# Patient Record
Sex: MALE | Race: BLACK OR AFRICAN AMERICAN | NOT HISPANIC OR LATINO | Employment: FULL TIME | URBAN - METROPOLITAN AREA
[De-identification: names, ages, dates, MRNs, and addresses within clinical notes are randomized per-mention and may not be internally consistent; named-entity substitution may affect disease eponyms.]

---

## 2017-08-27 ENCOUNTER — HOSPITAL ENCOUNTER (EMERGENCY)
Facility: HOSPITAL | Age: 42
Discharge: HOME | End: 2017-08-27
Attending: EMERGENCY MEDICINE
Payer: COMMERCIAL

## 2017-08-27 VITALS
WEIGHT: 232 LBS | RESPIRATION RATE: 20 BRPM | BODY MASS INDEX: 34.36 KG/M2 | OXYGEN SATURATION: 98 % | HEART RATE: 84 BPM | HEIGHT: 69 IN | TEMPERATURE: 99 F | DIASTOLIC BLOOD PRESSURE: 70 MMHG | SYSTOLIC BLOOD PRESSURE: 167 MMHG

## 2017-08-27 DIAGNOSIS — R42 DIZZINESS: Primary | ICD-10-CM

## 2017-08-27 DIAGNOSIS — H04.201 EYE TEARING, RIGHT: ICD-10-CM

## 2017-08-27 DIAGNOSIS — J34.1 MUCOUS RETENTION CYST OF MAXILLARY SINUS: ICD-10-CM

## 2017-08-27 LAB
ALBUMIN SERPL BCP-MCNC: 4.1 G/DL
ALP SERPL-CCNC: 49 U/L
ALT SERPL W/O P-5'-P-CCNC: 25 U/L
ANION GAP SERPL CALC-SCNC: 10 MMOL/L
AST SERPL-CCNC: 16 U/L
BASOPHILS # BLD AUTO: 0.01 K/UL
BASOPHILS NFR BLD: 0.2 %
BILIRUB SERPL-MCNC: 0.5 MG/DL
BILIRUB UR QL STRIP: NEGATIVE
BUN SERPL-MCNC: 12 MG/DL
CALCIUM SERPL-MCNC: 9.6 MG/DL
CHLORIDE SERPL-SCNC: 105 MMOL/L
CLARITY UR: CLEAR
CO2 SERPL-SCNC: 25 MMOL/L
COLOR UR: ABNORMAL
CREAT SERPL-MCNC: 1.3 MG/DL
DIFFERENTIAL METHOD: NORMAL
EOSINOPHIL # BLD AUTO: 0.1 K/UL
EOSINOPHIL NFR BLD: 1.2 %
ERYTHROCYTE [DISTWIDTH] IN BLOOD BY AUTOMATED COUNT: 12.2 %
EST. GFR  (AFRICAN AMERICAN): >60 ML/MIN/1.73 M^2
EST. GFR  (NON AFRICAN AMERICAN): >60 ML/MIN/1.73 M^2
GLUCOSE SERPL-MCNC: 105 MG/DL
GLUCOSE UR QL STRIP: NEGATIVE
HCT VFR BLD AUTO: 41.2 %
HGB BLD-MCNC: 14 G/DL
HGB UR QL STRIP: ABNORMAL
KETONES UR QL STRIP: NEGATIVE
LEUKOCYTE ESTERASE UR QL STRIP: NEGATIVE
LYMPHOCYTES # BLD AUTO: 1.8 K/UL
LYMPHOCYTES NFR BLD: 30.6 %
MCH RBC QN AUTO: 29.7 PG
MCHC RBC AUTO-ENTMCNC: 34 G/DL
MCV RBC AUTO: 88 FL
MICROSCOPIC COMMENT: ABNORMAL
MONOCYTES # BLD AUTO: 0.5 K/UL
MONOCYTES NFR BLD: 7.9 %
NEUTROPHILS # BLD AUTO: 3.5 K/UL
NEUTROPHILS NFR BLD: 59.8 %
NITRITE UR QL STRIP: NEGATIVE
PH UR STRIP: 5 [PH] (ref 5–8)
PLATELET # BLD AUTO: 298 K/UL
PMV BLD AUTO: 10.7 FL
POCT GLUCOSE: 106 MG/DL (ref 70–110)
POCT GLUCOSE: 97 MG/DL (ref 70–110)
POTASSIUM SERPL-SCNC: 4.2 MMOL/L
PROT SERPL-MCNC: 8 G/DL
PROT UR QL STRIP: NEGATIVE
RBC # BLD AUTO: 4.71 M/UL
RBC #/AREA URNS HPF: 5 /HPF (ref 0–4)
SODIUM SERPL-SCNC: 140 MMOL/L
SP GR UR STRIP: 1.01 (ref 1–1.03)
TROPONIN I SERPL DL<=0.01 NG/ML-MCNC: 0.01 NG/ML
URN SPEC COLLECT METH UR: ABNORMAL
UROBILINOGEN UR STRIP-ACNC: NEGATIVE EU/DL
WBC # BLD AUTO: 5.82 K/UL

## 2017-08-27 PROCEDURE — 85025 COMPLETE CBC W/AUTO DIFF WBC: CPT

## 2017-08-27 PROCEDURE — 99284 EMERGENCY DEPT VISIT MOD MDM: CPT | Mod: 25

## 2017-08-27 PROCEDURE — 84484 ASSAY OF TROPONIN QUANT: CPT

## 2017-08-27 PROCEDURE — 81000 URINALYSIS NONAUTO W/SCOPE: CPT

## 2017-08-27 PROCEDURE — 93005 ELECTROCARDIOGRAM TRACING: CPT

## 2017-08-27 PROCEDURE — 80053 COMPREHEN METABOLIC PANEL: CPT

## 2017-08-27 PROCEDURE — 82962 GLUCOSE BLOOD TEST: CPT

## 2017-08-27 PROCEDURE — 25000003 PHARM REV CODE 250: Performed by: EMERGENCY MEDICINE

## 2017-08-27 PROCEDURE — 96360 HYDRATION IV INFUSION INIT: CPT

## 2017-08-27 RX ORDER — FLUTICASONE PROPIONATE 50 MCG
1 SPRAY, SUSPENSION (ML) NASAL 2 TIMES DAILY
Qty: 1 BOTTLE | Refills: 1 | Status: SHIPPED | OUTPATIENT
Start: 2017-08-27 | End: 2017-10-26

## 2017-08-27 RX ORDER — MECLIZINE HYDROCHLORIDE CHEWABLE TABLETS 25 MG/1
32 TABLET, CHEWABLE ORAL 3 TIMES DAILY PRN
COMMUNITY
End: 2022-11-04

## 2017-08-27 RX ADMIN — SODIUM CHLORIDE 1000 ML: 0.9 INJECTION, SOLUTION INTRAVENOUS at 02:08

## 2017-08-27 NOTE — ED TRIAGE NOTES
Vertigo - Dizziness: Patient presents with dizziness .  The dizziness has been present for 3 days. The patient describes the symptoms as disequalibirum and lightheadedness. Symptoms are exacerbated by rapid head movements, rising from supine position and motion The patient also complains of none. Patient denies otalgia  tinnitus.  He has been treated with meclizine (Antivert) with no improvement.  Previous work up has been since [facility] Saturday morning .

## 2017-08-27 NOTE — ED PROVIDER NOTES
"Encounter Date: 8/27/2017    SCRIBE #1 NOTE: I, Rianna Elpidio, am scribing for, and in the presence of, Gael Martell MD. Other sections scribed: HPI/ROS.       History     Chief Complaint   Patient presents with    Dizziness     Pt. presents with listed symptoms that began on Wednesday and patient states, "I began to feel dizzy on Thursday". Pt. is ambulatory at this time.     Blurred Vision     CC: Dizziness    Pt is a 42 y.o. male w/ no significant PMHx presenting to the ED c/o dizziness/lightheadedness and a sense of being off-balance since Friday (8/25/17) and blurred vision & watery eyes since Wednesday (8/23/17). All listed symptoms are intermittent. Pt states the lightheadedness and off-balance feelings are worsened with standing and walking. Pt states he went to [employer] yesterday and was told to attempt Visine; upon use, he found minimal temporary relief for his watery eyes. Pt also attempted Meclizine with no relief. Pt states he works outside and it has been "very muggy."     Pt states he had lightheadedness, dizziness, and the off-balance feeling in 2011 but found significant relief with Meclizine. Pt denies HA, nausea, SOB, or any pain. Pt reports no further symptoms.       The history is provided by the patient.     Review of patient's allergies indicates:   Allergen Reactions    Asa [aspirin]      G6 PD deficiency    Methylene blue Other (See Comments)    Nitrofuran analogues      G6 PD deficiency    Sulfa (sulfonamide antibiotics) Other (See Comments)     G6 PD deficiency    Vitamin k analogues      History reviewed. No pertinent past medical history.  History reviewed. No pertinent surgical history.  History reviewed. No pertinent family history.  Social History   Substance Use Topics    Smoking status: Never Smoker    Smokeless tobacco: Never Used    Alcohol use Yes     Review of Systems   Constitutional: Negative for chills and fever.   HENT: Negative for ear pain, rhinorrhea " and sore throat.    Eyes: Positive for visual disturbance. Negative for redness.        (+) watery eyes   Respiratory: Negative for cough and shortness of breath.    Cardiovascular: Negative for chest pain.   Gastrointestinal: Negative for abdominal pain, diarrhea, nausea and vomiting.   Genitourinary: Negative for dysuria, frequency and hematuria.   Musculoskeletal: Negative for arthralgias, back pain and myalgias.   Skin: Negative for color change, rash and wound.   Neurological: Positive for dizziness and light-headedness. Negative for syncope, weakness and headaches.   Psychiatric/Behavioral: The patient is not nervous/anxious.        Physical Exam     Initial Vitals [08/27/17 1311]   BP Pulse Resp Temp SpO2   (!) 143/79 81 17 98.6 °F (37 °C) 100 %      MAP       100.33         Physical Exam  Nursing note and vitals reviewed.  Constitutional:  Nontoxic middle-aged male in no obvious distress or discomfort.  HENT:    Head: NC/AT    Eyes: Conjunctivae normal.   (-) scleral icterus.              Mouth/Throat: Dry mucosal membranes..      Neck: Neck supple, normal rom.    Cardiovascular: RRR  Pulmonary/Chest: CTAB   Abdominal: Soft. ND/NT   (-) CVA tenderness.  Musculoskeletal: FROM of all major joints. No extremity edema or tenderness.  Neurological: A&Ox3, Normal speech.  No acute focal motor deficits.     Skin: Skin is warm and dry.   Psychiatric: normal mood and affect.      ED Course   Procedures  Labs Reviewed   COMPREHENSIVE METABOLIC PANEL - Abnormal; Notable for the following:        Result Value    Alkaline Phosphatase 49 (*)     All other components within normal limits   URINALYSIS - Abnormal; Notable for the following:     Occult Blood UA 2+ (*)     All other components within normal limits   URINALYSIS MICROSCOPIC - Abnormal; Notable for the following:     RBC, UA 5 (*)     All other components within normal limits   CBC W/ AUTO DIFFERENTIAL   TROPONIN I   POCT GLUCOSE   POCT GLUCOSE        EKG  "Readings: (Independently Interpreted)   Initial Reading: No STEMI.   Normal sinus rhythm, rate 75, normal axis/intervals, no ST/T-wave abnormalities.      Differential diagnosis:   Initial differential diagnosis includes but is not limited to...Dysrhythmia, ACS, electrolyte abnormalities, toxidrome, withdrawal syndrome, orthostatic hypotension/dehydration, vertebrobasilar insufficiency, ischemic vs hemorrhagic stroke.     Additional Medical Decision Makin-year-old male with no significant past medical history presents the emergency department complaining of persistent dizziness, blurry vision, sinus congestion and a sense of" being off-balance" for the past 3 days.  Physical exam reveals a healthy appearing male in no obvious distress or discomfort.  Neuro exam without focal motor and/or sensory deficits.  He is A&O×4 and without evidence of clinical intoxication.  Vs reassuring.  Afebrile.  Basic labs wnls.   EKG normal.  CT head Reveals a mucus retention cyst in the maxillary sinus.  Which may be contributing to sinus congestion along with vertiginous symptoms.  Recommended Flonase Twice daily as well as close follow-up with both his primary care physician as well as ENT.  Return instructions discussed prior to discharge.                    Scribe Attestation:   Scribe #1: I performed the above scribed service and the documentation accurately describes the services I performed. I attest to the accuracy of the note.    Attending Attestation:           Physician Attestation for Scribe:  Physician Attestation Statement for Scribe #1: I, Gael Martell MD, reviewed documentation, as scribed by Rianna Magallanes in my presence, and it is both accurate and complete.                 ED Course      Clinical Impression:   The primary encounter diagnosis was Dizziness. Diagnoses of Eye tearing, right and Mucous retention cyst of maxillary sinus were also pertinent to this visit.                           Gael TREVINO" MD Jayshree  09/25/17 2012

## 2022-11-04 ENCOUNTER — HOSPITAL ENCOUNTER (EMERGENCY)
Facility: HOSPITAL | Age: 47
Discharge: HOME | End: 2022-11-04
Attending: EMERGENCY MEDICINE
Payer: COMMERCIAL

## 2022-11-04 VITALS
WEIGHT: 227 LBS | TEMPERATURE: 98 F | OXYGEN SATURATION: 96 % | HEIGHT: 69 IN | BODY MASS INDEX: 33.62 KG/M2 | SYSTOLIC BLOOD PRESSURE: 139 MMHG | RESPIRATION RATE: 20 BRPM | DIASTOLIC BLOOD PRESSURE: 85 MMHG | HEART RATE: 78 BPM
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
## 2022-11-04 DIAGNOSIS — S09.90XA CLOSED HEAD INJURY, INITIAL ENCOUNTER: ICD-10-CM

## 2022-11-04 PROCEDURE — 99284 EMERGENCY DEPT VISIT MOD MDM: CPT | Mod: 25

## 2022-11-04 RX ORDER — ONDANSETRON 8 MG/1
8 TABLET, ORALLY DISINTEGRATING ORAL EVERY 6 HOURS PRN
Qty: 30 TABLET | Refills: 0 | Status: SHIPPED | OUTPATIENT
Start: 2022-11-04

## 2022-11-04 RX ORDER — MECLIZINE HYDROCHLORIDE 25 MG/1
25 TABLET ORAL 3 TIMES DAILY PRN
Qty: 20 TABLET | Refills: 0 | Status: SHIPPED | OUTPATIENT
Start: 2022-11-04

## 2022-11-04 NOTE — ED PROVIDER NOTES
Encounter Date: 11/4/2022       History     Chief Complaint   Patient presents with    Fall     Pt reports he was at work and lost his balance. He fell and struck his head on a railing. Pt denies loc, nausea, or vomiting.      47 y.o. male No past medical history on file. Presents for evaluation of closed head injury. Notes that he fell backwards after losing his balance at work at approx 2:30 AM, resulting in him striking his head on a guard rail. Denies LOC but notes feeling woozy, endorses a headache, denies neck/back pain. Not on blood thinners, no focal neuro complaints.    Review of patient's allergies indicates:   Allergen Reactions    Asa [aspirin]      G6 PD deficiency    Methylene blue Other (See Comments)    Nitrofuran analogues      G6 PD deficiency    Sulfa (sulfonamide antibiotics) Other (See Comments)     G6 PD deficiency    Vitamin k analogues      History reviewed. No pertinent past medical history.  History reviewed. No pertinent surgical history.  History reviewed. No pertinent family history.  Social History     Tobacco Use    Smoking status: Never    Smokeless tobacco: Never   Substance Use Topics    Alcohol use: Yes    Drug use: No     Review of Systems   Constitutional:  Negative for fever.   HENT:  Negative for sore throat.    Respiratory:  Negative for shortness of breath.    Cardiovascular:  Negative for chest pain.   Gastrointestinal:  Negative for nausea.   Genitourinary:  Negative for dysuria.   Musculoskeletal:  Negative for back pain.   Skin:  Negative for rash.   Neurological:  Negative for weakness.   Hematological:  Does not bruise/bleed easily.   All other systems reviewed and are negative.    Physical Exam     Initial Vitals [11/04/22 0400]   BP Pulse Resp Temp SpO2   (!) 156/90 81 17 98.2 °F (36.8 °C) 100 %      MAP       --         Physical Exam    Nursing note and vitals reviewed.  Constitutional: He appears well-developed and well-nourished.   HENT:   Head: Normocephalic and  atraumatic.   Eyes: EOM are normal. Pupils are equal, round, and reactive to light.   Cardiovascular:  Normal rate.           Pulmonary/Chest: Effort normal. No respiratory distress.   Abdominal: He exhibits no distension.   Musculoskeletal:         General: No tenderness or edema.     Neurological: He is alert and oriented to person, place, and time.   Skin: Skin is warm and dry.   Psychiatric: He has a normal mood and affect.     No midline ttp on any spinal body on neck/back  No cephalohematoma noted  ED Course   Procedures  Labs Reviewed - No data to display       Imaging Results              CT Head Without Contrast (Final result)  Result time 11/04/22 04:57:29      Final result by Teja Bueno MD (11/04/22 04:57:29)                   Impression:      There is no evidence for acute intracranial process.    Paranasal sinus disease, as above including small air-fluid level of the right maxillary antrum that can be seen with sinusitis.      Electronically signed by: Teja Bueno  Date:    11/04/2022  Time:    04:57               Narrative:    EXAMINATION:  CT HEAD WITHOUT CONTRAST    CLINICAL HISTORY:  Head trauma, moderate-severe;    TECHNIQUE:  Low dose axial images were obtained through the head.  Coronal and sagittal reformations were also performed. Contrast was not administered.    COMPARISON:  CT examination of the brain without contrast August 27, 2017    FINDINGS:  The ventricular system, sulcal pattern and parenchymal attenuation characteristics appear appropriate for age.  There is no evidence for intracranial mass, mass effect or midline shift.  There is no evidence for acute intracranial hemorrhage.  Appropriate CSF spaces are seen at the skull base.    The visualized orbits appear intact.  There is a small air-fluid level of the right maxillary antrum, there is an additional finding at the inferior right maxillary antrum that may relate to a moderate-sized mucous retention cyst.  The  paranasal sinuses otherwise appear predominantly well aerated as do the visualized mastoid air cells.  The visualized osseous structures appear intact.                                       CT Cervical Spine Without Contrast (Final result)  Result time 11/04/22 04:57:20      Final result by Teja Bueno MD (11/04/22 04:57:20)                   Impression:      Multilevel chronic change of the cervical spine, correlation for any specific level of symptomatology is needed.    On close evaluation of available imaging, there is no evidence for acute cervical spine fracture deformity.      Electronically signed by: Teja Bueno  Date:    11/04/2022  Time:    04:57               Narrative:    EXAMINATION:  CT CERVICAL SPINE WITHOUT CONTRAST    CLINICAL HISTORY:  Neck trauma, dangerous injury mechanism (Age 16-64y);    TECHNIQUE:  Low dose axial images, sagittal and coronal reformations were performed though the cervical spine.  Contrast was not administered.    COMPARISON:  None    FINDINGS:  There is straightening of the cervical spine.  Chronic appearing endplate change and marginal osteophyte formation is noted.  There is no evidence for high-grade spondylolisthesis.  There is no evidence for high-grade or acute compression fracture deformity.  Facet arthropathy is noted.  There is facet fusion on the left at C2-3.  There is no evidence for facet dislocation, there is no evidence for facet fracture.  The occipital condyles articulate appropriately with the superior articular facets of C1 at the craniocervical junction.    C1 and C2 appear intact.  There is no abnormal widening of the atlantal dental space.    C2-3 level demonstrates mild chronic change, there is mild foraminal narrowing on the left.  There is no high-grade spinal canal stenosis.    C3-4 level demonstrates mild disc osteophyte disease, mild anterior impression upon the dural sac.  There is mild left foraminal narrowing.    The C4-5 level  demonstrates disc osteophyte disease, mild anterior impression upon the dural sac.  There is no high-grade spinal canal stenosis.  Mild foraminal narrowing without high-grade stenosis.    Posterior to the C5 vertebral level there is osseous density along the left paramidline location, this may relate to calcifications/ossification along the left paramidline aspect of the posterior longitudinal ligament.  This does produce mild anterior impression upon the dural sac.    The C5-6 level demonstrates disc osteophyte disease with mild anterior impression upon the dural sac without high-grade spinal canal stenosis.  There is uncovertebral spurring, there is right greater than left foraminal narrowing.    The C6-7 level demonstrates mild disc osteophyte disease.  There is no high-grade spinal canal stenosis.  There is mild bilateral foraminal narrowing.    The C7-T1 level demonstrates disc osteophyte disease with mild anterior impression upon the dural sac.  Mild asymmetric osteophyte on the right.  There is no high-grade spinal canal stenosis.  There is bilateral foraminal narrowing.    On close evaluation of available imaging, there is no evidence for acute cervical spine fracture deformity.                                       Medications - No data to display                    Have ordered head ct/cspine ct given mechanism     Labs Reviewed - No data to display    CT Head Without Contrast   Final Result      There is no evidence for acute intracranial process.      Paranasal sinus disease, as above including small air-fluid level of the right maxillary antrum that can be seen with sinusitis.         Electronically signed by: Teja Bueno   Date:    11/04/2022   Time:    04:57      CT Cervical Spine Without Contrast   Final Result      Multilevel chronic change of the cervical spine, correlation for any specific level of symptomatology is needed.      On close evaluation of available imaging, there is no evidence for  acute cervical spine fracture deformity.         Electronically signed by: Teja Bueno   Date:    11/04/2022   Time:    04:57            Clinical Impression:   Final diagnoses:  [W19.XXXA] Fall, initial encounter (Primary)  [S09.90XA] Closed head injury, initial encounter      ED Disposition Condition    Discharge Stable          ED Prescriptions    None       Follow-up Information    None          Declan Mata MD  11/04/22 0508

## 2022-11-04 NOTE — Clinical Note
"Hay"Yarelis Alexandre was seen and treated in our emergency department on 11/4/2022.  He may return to work on 11/04/2022.  Return to work, full duty as tolerated without restrictions.     If you have any questions or concerns, please don't hesitate to call.      Declan Mata MD"

## 2022-11-04 NOTE — ED TRIAGE NOTES
"Pt presents to ED d/t fall while at work. Pt reports falling backwards, injuring the back of his head, reports 5/10 pain. Denies any vision changes, numbness, tingling, N/V/D, chest pain, SOB, fever, chills. Pt states, " I feel fine, I just want to make sure my head is okay". No trauma or active bleeding noted. Pt is alert, calm, and oriented x4, 100% on RA.   "

## 2022-11-04 NOTE — DISCHARGE INSTRUCTIONS
Thank you for coming to our Emergency Department today. It is important to remember that some problems or medical conditions are difficult to diagnose and may not be found or addressed during your Emergency Department visit.     Be sure to follow up with your primary care doctor and review all labs/imaging/tests that were performed during your ER visit with them. Some labs/imaging/tests may be outside of the normal range, and require non-emergent follow-up and/or further investigation/treatment/procedures/testing to help diagnose/exclude/prevent complications or other potentially serious medical conditions that were not discussed or addressed during your ER visit.    If you do not have a primary care doctor, you may contact the one listed on your discharge paperwork or you may also call the [facility] Appointment Desk at [phone] to schedule an appointment and establish care with one. It is important to your health that you have a primary care doctor.    Please take all medications as directed. All medications may potentially have side-effects and it is impossible to predict which medications may give you side-effects or what side-effects (if any) they will give you.. If you feel that you are having a negative effect or side-effect of any medication you should immediately stop taking them and seek medical attention. If you feel that you are having a life-threatening reaction call 911.    Return to the ER with any questions/concerns, new/concerning symptoms, worsening or failure to improve.     Do not drive, swim, climb to height, take a bath, operate heavy machinery, drink alcohol or take potentially sedating medications, sign any legal documents or make any important decisions for 24 hours if you have received any pain medications, sedatives or mood altering drugs during your ER visit or within 24 hours of taking them if they have been prescribed to you.     You can find additional resources for Dentists,  hearing aids, durable medical equipment, low cost pharmacies and other resources at https://[facility].org    BELOW THIS LINE ONLY APPLIES IF YOU HAVE A COVID TEST PENDING OR IF YOU HAVE BEEN DIAGNOSED WITH COVID:  Please access [facility] to review the results of your test. Until the results of your COVID test return, you should isolate yourself so as not to potentially spread illness to others.   If your COVID test returns positive, you should isolate yourself so as not to spread illness to others. After five full days, if you are feeling better and you have not had fever for 24 hours, you can return to your typical daily activities, but you must wear a mask around others for an additional 5 days.   If your COVID test returns negative and you are either unvaccinated or more than six months out from your two-dose vaccine and are not yet boosted, you should still quarantine for 5 full days followed by strict mask use for an additional 5 full days.   If your COVID test returns negative and you have received your 2-dose initial vaccine as well as a booster, you should continue strict mask use for 10 full days after the exposure.  For all those exposed, best practice includes a test at day 5 after the exposure. This can be a home test or a test through one of the many testing centers throughout our community.   Masking is always advised to limit the spread of COVID. Cdc.gov is an excellent site to obtain the latest up to date recommendations regarding COVID and COVID testing.     CDC Testing and Quarantine Guidelines for patients with exposure to a known-positive COVID-19 person:  A close exposure is defined as anyone who has had an exposure (masked or unmasked) to a known COVID -19 positive person within 6 feet of someone for a cumulative total of 15 minutes or more over a 24-hour period.   Vaccinated and/or if you recently had a positive covid test within 90 days do NOT need to quarantine after contact with someone  who had COVID-19 unless you develop symptoms.   Fully vaccinated people who have not had a positive test within 90 days, should get tested 3-5 days after their exposure, even if they don't have symptoms and wear a mask indoors in public for 14 days following exposure or until their test result is negative.      Unvaccinated and/or NOT had a positive test within 90 days and meet close exposure  You are required by CDC guidelines to quarantine for at least 5 days from time of exposure followed by 5 days of strict masking. It is recommended, but not required to test after 5 days, unless you develop symptoms, in which case you should test at that time.  If you get tested after 5 days and your test is positive, your 5 day period of isolation starts the day of the positive test.    If your exposure does not meet the above definition, you can return to your normal daily activities to include social distancing, wearing a mask and frequent handwashing.      Here is a link to guidance from the CDC:  https://www.cdc.gov/media/releases/2021/s1227-isolation-quarantine-guidance.html      [facility] Testing Sites:  https://ldh.la.gov/page/3934      [facility] website with testing locations and guidance:  https://[email or web address].[email or web address]